# Patient Record
Sex: MALE | Race: WHITE | Employment: UNEMPLOYED | URBAN - METROPOLITAN AREA
[De-identification: names, ages, dates, MRNs, and addresses within clinical notes are randomized per-mention and may not be internally consistent; named-entity substitution may affect disease eponyms.]

---

## 2017-02-01 ENCOUNTER — OFFICE VISIT (OUTPATIENT)
Dept: INTERNAL MEDICINE | Age: 22
End: 2017-02-01

## 2017-02-01 VITALS
HEART RATE: 92 BPM | SYSTOLIC BLOOD PRESSURE: 114 MMHG | WEIGHT: 141 LBS | DIASTOLIC BLOOD PRESSURE: 72 MMHG | BODY MASS INDEX: 20.23 KG/M2 | OXYGEN SATURATION: 99 %

## 2017-02-01 DIAGNOSIS — K64.5 THROMBOSED HEMORRHOIDS: Primary | ICD-10-CM

## 2017-02-01 DIAGNOSIS — J00 ACUTE NASOPHARYNGITIS: ICD-10-CM

## 2017-02-01 DIAGNOSIS — R52 BODY ACHES: ICD-10-CM

## 2017-02-01 LAB
INFLUENZA A ANTIBODY: NORMAL
INFLUENZA B ANTIBODY: NORMAL

## 2017-02-01 PROCEDURE — 87804 INFLUENZA ASSAY W/OPTIC: CPT | Performed by: INTERNAL MEDICINE

## 2017-02-01 PROCEDURE — 99213 OFFICE O/P EST LOW 20 MIN: CPT | Performed by: INTERNAL MEDICINE

## 2017-02-01 RX ORDER — AZITHROMYCIN 250 MG/1
TABLET, FILM COATED ORAL
Qty: 1 PACKET | Refills: 0 | Status: SHIPPED | OUTPATIENT
Start: 2017-02-01 | End: 2017-02-11

## 2017-02-01 ASSESSMENT — ENCOUNTER SYMPTOMS
CHEST TIGHTNESS: 0
RESPIRATORY NEGATIVE: 1
WHEEZING: 0
SHORTNESS OF BREATH: 0
GASTROINTESTINAL NEGATIVE: 1

## 2017-02-06 ENCOUNTER — OFFICE VISIT (OUTPATIENT)
Dept: SURGERY | Age: 22
End: 2017-02-06

## 2017-02-06 VITALS
WEIGHT: 139 LBS | BODY MASS INDEX: 19.9 KG/M2 | DIASTOLIC BLOOD PRESSURE: 72 MMHG | HEIGHT: 70 IN | SYSTOLIC BLOOD PRESSURE: 122 MMHG

## 2017-02-06 DIAGNOSIS — K62.89 ANAL PAIN: Primary | ICD-10-CM

## 2017-02-06 DIAGNOSIS — K64.5 THROMBOSED HEMORRHOIDS: ICD-10-CM

## 2017-02-06 PROCEDURE — 46320 REMOVAL OF HEMORRHOID CLOT: CPT | Performed by: SURGERY

## 2017-02-14 ENCOUNTER — TELEPHONE (OUTPATIENT)
Dept: SURGERY | Age: 22
End: 2017-02-14

## 2017-02-23 ENCOUNTER — TELEPHONE (OUTPATIENT)
Dept: SURGERY | Age: 22
End: 2017-02-23

## 2017-03-08 ENCOUNTER — OFFICE VISIT (OUTPATIENT)
Dept: SURGERY | Age: 22
End: 2017-03-08

## 2017-03-08 VITALS
SYSTOLIC BLOOD PRESSURE: 120 MMHG | WEIGHT: 145 LBS | BODY MASS INDEX: 20.76 KG/M2 | HEIGHT: 70 IN | DIASTOLIC BLOOD PRESSURE: 76 MMHG

## 2017-03-08 DIAGNOSIS — K64.5 THROMBOSED HEMORRHOIDS: Primary | ICD-10-CM

## 2017-03-08 DIAGNOSIS — K62.89 ANAL PAIN: ICD-10-CM

## 2017-03-08 PROCEDURE — 99213 OFFICE O/P EST LOW 20 MIN: CPT | Performed by: SURGERY

## 2017-09-26 ENCOUNTER — TELEPHONE (OUTPATIENT)
Dept: INTERNAL MEDICINE | Age: 22
End: 2017-09-26

## 2017-09-27 ENCOUNTER — TELEPHONE (OUTPATIENT)
Dept: INTERNAL MEDICINE | Age: 22
End: 2017-09-27

## 2017-09-29 ENCOUNTER — OFFICE VISIT (OUTPATIENT)
Dept: INTERNAL MEDICINE | Age: 22
End: 2017-09-29

## 2017-09-29 VITALS
SYSTOLIC BLOOD PRESSURE: 110 MMHG | BODY MASS INDEX: 21.81 KG/M2 | WEIGHT: 152 LBS | HEART RATE: 74 BPM | DIASTOLIC BLOOD PRESSURE: 72 MMHG | OXYGEN SATURATION: 99 %

## 2017-09-29 DIAGNOSIS — L50.3 DERMATOGRAPHISM: ICD-10-CM

## 2017-09-29 DIAGNOSIS — L50.8 URTICARIA, CHRONIC: ICD-10-CM

## 2017-09-29 DIAGNOSIS — L50.8 URTICARIA, CHRONIC: Primary | ICD-10-CM

## 2017-09-29 PROBLEM — J00 ACUTE NASOPHARYNGITIS: Status: RESOLVED | Noted: 2017-02-01 | Resolved: 2017-09-29

## 2017-09-29 LAB
A/G RATIO: 2.2 (ref 1.1–2.2)
ALBUMIN SERPL-MCNC: 4.9 G/DL (ref 3.4–5)
ALP BLD-CCNC: 64 U/L (ref 40–129)
ALT SERPL-CCNC: 18 U/L (ref 10–40)
ANION GAP SERPL CALCULATED.3IONS-SCNC: 13 MMOL/L (ref 3–16)
AST SERPL-CCNC: 17 U/L (ref 15–37)
BASOPHILS ABSOLUTE: 0 K/UL (ref 0–0.2)
BASOPHILS RELATIVE PERCENT: 0.2 %
BILIRUB SERPL-MCNC: 0.9 MG/DL (ref 0–1)
BUN BLDV-MCNC: 10 MG/DL (ref 7–20)
CALCIUM SERPL-MCNC: 9.9 MG/DL (ref 8.3–10.6)
CHLORIDE BLD-SCNC: 101 MMOL/L (ref 99–110)
CO2: 28 MMOL/L (ref 21–32)
CREAT SERPL-MCNC: 0.9 MG/DL (ref 0.9–1.3)
EOSINOPHILS ABSOLUTE: 0.1 K/UL (ref 0–0.6)
EOSINOPHILS RELATIVE PERCENT: 1.3 %
GFR AFRICAN AMERICAN: >60
GFR NON-AFRICAN AMERICAN: >60
GLOBULIN: 2.2 G/DL
GLUCOSE BLD-MCNC: 89 MG/DL (ref 70–99)
HCT VFR BLD CALC: 46.1 % (ref 40.5–52.5)
HEMOGLOBIN: 16 G/DL (ref 13.5–17.5)
IGA: 126 MG/DL (ref 70–400)
IGG: 797 MG/DL (ref 700–1600)
IGM: 103 MG/DL (ref 40–230)
LYMPHOCYTES ABSOLUTE: 1.5 K/UL (ref 1–5.1)
LYMPHOCYTES RELATIVE PERCENT: 35.8 %
MCH RBC QN AUTO: 31.6 PG (ref 26–34)
MCHC RBC AUTO-ENTMCNC: 34.7 G/DL (ref 31–36)
MCV RBC AUTO: 91 FL (ref 80–100)
MONOCYTES ABSOLUTE: 0.2 K/UL (ref 0–1.3)
MONOCYTES RELATIVE PERCENT: 5.5 %
NEUTROPHILS ABSOLUTE: 2.4 K/UL (ref 1.7–7.7)
NEUTROPHILS RELATIVE PERCENT: 57.2 %
PDW BLD-RTO: 13.1 % (ref 12.4–15.4)
PLATELET # BLD: 162 K/UL (ref 135–450)
PMV BLD AUTO: 9.2 FL (ref 5–10.5)
POTASSIUM SERPL-SCNC: 4.3 MMOL/L (ref 3.5–5.1)
RBC # BLD: 5.06 M/UL (ref 4.2–5.9)
SEDIMENTATION RATE, ERYTHROCYTE: 3 MM/HR (ref 0–15)
SODIUM BLD-SCNC: 142 MMOL/L (ref 136–145)
TOTAL PROTEIN: 7.1 G/DL (ref 6.4–8.2)
TSH SERPL DL<=0.05 MIU/L-ACNC: 2.38 UIU/ML (ref 0.27–4.2)
WBC # BLD: 4.3 K/UL (ref 4–11)

## 2017-09-29 PROCEDURE — 99213 OFFICE O/P EST LOW 20 MIN: CPT | Performed by: INTERNAL MEDICINE

## 2017-09-29 RX ORDER — FEXOFENADINE HCL 180 MG/1
180 TABLET ORAL DAILY
Qty: 30 TABLET | Refills: 3 | Status: SHIPPED | OUTPATIENT
Start: 2017-09-29

## 2017-09-29 RX ORDER — FAMOTIDINE 40 MG/1
40 TABLET, FILM COATED ORAL EVERY EVENING
Qty: 30 TABLET | Refills: 3 | Status: SHIPPED | OUTPATIENT
Start: 2017-09-29

## 2017-09-29 ASSESSMENT — ENCOUNTER SYMPTOMS
RESPIRATORY NEGATIVE: 1
WHEEZING: 0
GASTROINTESTINAL NEGATIVE: 1
SHORTNESS OF BREATH: 0
CHEST TIGHTNESS: 0

## 2017-09-29 ASSESSMENT — PATIENT HEALTH QUESTIONNAIRE - PHQ9
SUM OF ALL RESPONSES TO PHQ9 QUESTIONS 1 & 2: 0
1. LITTLE INTEREST OR PLEASURE IN DOING THINGS: 0
2. FEELING DOWN, DEPRESSED OR HOPELESS: 0
SUM OF ALL RESPONSES TO PHQ QUESTIONS 1-9: 0

## 2017-10-02 LAB
ANA INTERPRETATION: NORMAL
ANTI-NUCLEAR ANTIBODY (ANA): NEGATIVE
IGE: 143 KU/L

## 2017-10-04 ENCOUNTER — PATIENT MESSAGE (OUTPATIENT)
Dept: INTERNAL MEDICINE | Age: 22
End: 2017-10-04

## 2017-10-12 ENCOUNTER — OFFICE VISIT (OUTPATIENT)
Dept: INTERNAL MEDICINE | Age: 22
End: 2017-10-12

## 2017-10-12 VITALS
BODY MASS INDEX: 21.81 KG/M2 | SYSTOLIC BLOOD PRESSURE: 112 MMHG | OXYGEN SATURATION: 100 % | HEART RATE: 90 BPM | WEIGHT: 152 LBS | DIASTOLIC BLOOD PRESSURE: 64 MMHG

## 2017-10-12 DIAGNOSIS — L50.8 URTICARIA, CHRONIC: ICD-10-CM

## 2017-10-12 DIAGNOSIS — L50.3 DERMATOGRAPHISM: ICD-10-CM

## 2017-10-12 DIAGNOSIS — F41.9 ANXIETY: ICD-10-CM

## 2017-10-12 PROCEDURE — 99213 OFFICE O/P EST LOW 20 MIN: CPT | Performed by: INTERNAL MEDICINE

## 2017-10-12 RX ORDER — ESCITALOPRAM OXALATE 10 MG/1
10 TABLET ORAL DAILY
Qty: 30 TABLET | Refills: 3 | Status: SHIPPED | OUTPATIENT
Start: 2017-10-12 | End: 2018-03-30

## 2018-03-30 ENCOUNTER — OFFICE VISIT (OUTPATIENT)
Dept: INTERNAL MEDICINE | Age: 23
End: 2018-03-30

## 2018-03-30 VITALS
HEIGHT: 70 IN | HEART RATE: 70 BPM | DIASTOLIC BLOOD PRESSURE: 70 MMHG | SYSTOLIC BLOOD PRESSURE: 120 MMHG | WEIGHT: 158.2 LBS | BODY MASS INDEX: 22.65 KG/M2 | OXYGEN SATURATION: 98 %

## 2018-03-30 DIAGNOSIS — F41.9 ANXIETY: ICD-10-CM

## 2018-03-30 PROCEDURE — 99213 OFFICE O/P EST LOW 20 MIN: CPT | Performed by: INTERNAL MEDICINE

## 2018-03-30 NOTE — PROGRESS NOTES
Subjective:      Patient ID: Kareem Gonzales is a 25 y.o. y.o. male. HPI    Patient is here for a recheck    Vitals:    03/30/18 1217   BP: 120/70   Pulse: 70   SpO2: 98%       Wt Readings from Last 3 Encounters:   03/30/18 158 lb 3.2 oz (71.8 kg)   10/12/17 152 lb (68.9 kg)   09/29/17 152 lb (68.9 kg)     He did not like Lexapro. He states it made him more relaxed but did not take care of anxiety. He states he was told in the past he had ADHD. He has not been officially tested. He has been on Adderall in the past and did well. He states he did well. Discussed use, benefit, and side effects of prescribed medications. Barriers to medication compliance addressed. All patient questions answered. Pt voiced understanding. Review of Systems   Psychiatric/Behavioral: Positive for decreased concentration. Negative for agitation, sleep disturbance and suicidal ideas. The patient is nervous/anxious. Objective:   Physical Exam   Constitutional: He appears well-developed and well-nourished. Neck: Trachea normal. No JVD present. Carotid bruit is not present. No edema present. Cardiovascular: Normal rate, regular rhythm, S1 normal and S2 normal.    No murmur heard. Pulmonary/Chest: Effort normal and breath sounds normal. He has no wheezes. He has no rhonchi. He has no rales.        Assessment:      See Problem List assessment and plan       Plan:     Anxiety  Will send for ADHD testing  Off medication

## 2018-11-30 ENCOUNTER — TELEPHONE (OUTPATIENT)
Dept: INTERNAL MEDICINE CLINIC | Age: 23
End: 2018-11-30

## 2018-11-30 ENCOUNTER — OFFICE VISIT (OUTPATIENT)
Dept: INTERNAL MEDICINE CLINIC | Age: 23
End: 2018-11-30
Payer: COMMERCIAL

## 2018-11-30 VITALS
OXYGEN SATURATION: 98 % | SYSTOLIC BLOOD PRESSURE: 100 MMHG | WEIGHT: 161 LBS | HEIGHT: 70 IN | DIASTOLIC BLOOD PRESSURE: 60 MMHG | BODY MASS INDEX: 23.05 KG/M2 | HEART RATE: 69 BPM

## 2018-11-30 DIAGNOSIS — S83.511D RUPTURE OF ANTERIOR CRUCIATE LIGAMENT OF RIGHT KNEE, SUBSEQUENT ENCOUNTER: ICD-10-CM

## 2018-11-30 DIAGNOSIS — Z01.818 PREOP EXAMINATION: Primary | ICD-10-CM

## 2018-11-30 PROCEDURE — 99243 OFF/OP CNSLTJ NEW/EST LOW 30: CPT | Performed by: NURSE PRACTITIONER

## 2018-11-30 RX ORDER — DEXTROAMPHETAMINE SACCHARATE, AMPHETAMINE ASPARTATE, DEXTROAMPHETAMINE SULFATE AND AMPHETAMINE SULFATE 2.5; 2.5; 2.5; 2.5 MG/1; MG/1; MG/1; MG/1
10 TABLET ORAL DAILY
COMMUNITY

## 2018-11-30 ASSESSMENT — ENCOUNTER SYMPTOMS
EYE REDNESS: 0
SORE THROAT: 0
NAUSEA: 0
CHEST TIGHTNESS: 0
RHINORRHEA: 0
VOMITING: 0
SINUS PRESSURE: 0
CONSTIPATION: 0
COLOR CHANGE: 0
BACK PAIN: 0
SHORTNESS OF BREATH: 0
ABDOMINAL PAIN: 0
EYE ITCHING: 0
BLOOD IN STOOL: 0
DIARRHEA: 0
COUGH: 0
WHEEZING: 0

## 2018-11-30 ASSESSMENT — PATIENT HEALTH QUESTIONNAIRE - PHQ9
SUM OF ALL RESPONSES TO PHQ QUESTIONS 1-9: 0
2. FEELING DOWN, DEPRESSED OR HOPELESS: 0
SUM OF ALL RESPONSES TO PHQ9 QUESTIONS 1 & 2: 0
SUM OF ALL RESPONSES TO PHQ QUESTIONS 1-9: 0
1. LITTLE INTEREST OR PLEASURE IN DOING THINGS: 0

## 2019-05-20 ENCOUNTER — TELEPHONE (OUTPATIENT)
Dept: SURGERY | Age: 24
End: 2019-05-20

## 2019-05-20 NOTE — TELEPHONE ENCOUNTER
Pt's mom, Danya Houston called to make him an appt due to bleeding hemorrhoids  Pt is in a lot of pain  He did not go to work today  He was seen by Dr Surjit Kevin in 2017 for hemorrhoids    No available appts until No[place]

## 2019-05-21 ENCOUNTER — OFFICE VISIT (OUTPATIENT)
Dept: SURGERY | Age: 24
End: 2019-05-21
Payer: COMMERCIAL

## 2019-05-21 VITALS
HEART RATE: 62 BPM | BODY MASS INDEX: 21.62 KG/M2 | SYSTOLIC BLOOD PRESSURE: 119 MMHG | DIASTOLIC BLOOD PRESSURE: 65 MMHG | WEIGHT: 151 LBS | HEIGHT: 70 IN

## 2019-05-21 DIAGNOSIS — K64.5 THROMBOSED HEMORRHOIDS: Primary | ICD-10-CM

## 2019-05-21 PROCEDURE — 46320 REMOVAL OF HEMORRHOID CLOT: CPT | Performed by: SURGERY

## 2019-05-21 NOTE — PROGRESS NOTES
EXCISION OF EXTERNAL HEMORRHOID PROCEDURE NOTE:     Patient presented with symptomatic thrombosed external hemorrhoid unresponsive to conservative management.     Risks of procedure explained to patient, including but not limited to: immediate and delayed bleeding, pain, infection, pelvic sepsis, urinary retention, sphincter dysfunction, need for additional procedures, and recurrence. Chaperone/MA present in room during entire procedure.     Patient was placed in knee-chest positioning. Exam table manipulated for proper visualization and lighting. Buttocks spread. Digital exam and anoscopy performed revealing left posterolateral thrombosed external hemorrhoid x 2.     Lidocaine jelly used for topical anesthetic. Betadine used for skin prep. 5 cc local anesthetic (1% lidocaine) used for local anesthetic. 11 blade scalpel used to make elliptical incision. Scissors used to dissect under external hemorrhoid plexus x 2. Completely excised both hemorrhoids. Pressure held for hemostasis.      Patient tolerated the procedure well without complication. Minimal blood loss. Post procedure expectations and instructions explained to patient.     Disposition: Follow up in office in 2-3 weeks for wound check. DISCHARGE INSTRUCTIONS:  Keep stools soft and regular with the use of a fiber supplement, drinking plenty of water, and a diet consisting of fruits and vegetables. MiraLax and Colace (stool softener) can be used as needed as well. Cleanse the wound twice daily and after bowel movements with a sitz bath or gentle water spray from a shower head. Please call the office ([phone]) or go to the nearest emergency room if you have any questions or concerns, or experience: uncontrolled bleeding, fever, chills, change in appetite, foul smelling drainage, or difficulty passing urine. Be sure to follow up in the office to check your wound as scheduled.

## 2019-05-21 NOTE — PATIENT INSTRUCTIONS
DISCHARGE INSTRUCTIONS:  Keep stools soft and regular with the use of a fiber supplement, drinking plenty of water, and a diet consisting of fruits and vegetables. MiraLax and Colace (stool softener) can be used as needed as well.  Cleanse the wound twice daily and after bowel movements with a sitz bath or gentle water spray from a shower head.     Please call the office ([phone]) or go to the nearest emergency room if you have any questions or concerns, or experience: uncontrolled bleeding, fever, chills, change in appetite, foul smelling drainage, or difficulty passing urine.     Be sure to follow up in the office to check your wound as scheduled

## 2019-06-11 ENCOUNTER — OFFICE VISIT (OUTPATIENT)
Dept: SURGERY | Age: 24
End: 2019-06-11

## 2019-06-11 VITALS
HEIGHT: 70 IN | WEIGHT: 147 LBS | HEART RATE: 66 BPM | SYSTOLIC BLOOD PRESSURE: 129 MMHG | DIASTOLIC BLOOD PRESSURE: 74 MMHG | BODY MASS INDEX: 21.05 KG/M2

## 2019-06-11 DIAGNOSIS — K64.5 THROMBOSED HEMORRHOIDS: Primary | ICD-10-CM

## 2019-06-11 PROCEDURE — 99024 POSTOP FOLLOW-UP VISIT: CPT | Performed by: SURGERY

## 2019-06-11 NOTE — PROGRESS NOTES
[address] [address] [address] [employer]  [address] [facility]  Dept: [phone]  Dept Fax: [phone]  Loc: [phone]    Visit Date: 6/11/2019    Danet Montiel is a 25 y.o. male who presents today for: Post-Op Check (2 week post op: Thrombosed hemorrhoids)      HPI:       Dante Montiel is a 25 y.o. male known to me after excision of thrombosed external hemorrhoid in the office 2 weeks ago. Overall doing much better. Minimal pain. No bleeding. Tolerating regular diet and having regular bowel movements. History reviewed. No pertinent past medical history. History reviewed. No pertinent surgical history. Current Outpatient Medications:     amphetamine-dextroamphetamine (ADDERALL) 10 MG tablet, Take 10 mg by mouth daily. ., Disp: , Rfl:     fexofenadine (ALLEGRA ALLERGY) 180 MG tablet, Take 1 tablet by mouth daily, Disp: 30 tablet, Rfl: 3    famotidine (PEPCID) 40 MG tablet, Take 1 tablet by mouth every evening, Disp: 30 tablet, Rfl: 3  Allergies   Allergen Reactions    No Known Allergies      History reviewed. No pertinent surgical history. Family History   Problem Relation Age of Onset    Hypertension Father        Social History:   Social History     Tobacco Use    Smoking status: Never Smoker    Smokeless tobacco: Never Used   Substance Use Topics    Alcohol use: No     Comment: occasionally      Tobacco cessation counseling provided as appropriate. REVIEW OF SYSTEMS:    Pertinent positives and negatives are mentioned in the HPI. Otherwise, all other systems were reviewed and negative. Objective:     Physical Exam   /74 (Site: Left Upper Arm, Position: Sitting, Cuff Size: Small Adult)   Pulse 66   Ht 5' 10\" (1.778 m)   Wt 147 lb (66.7 kg)   BMI 21.09 kg/m²   Constitutional: Appears well-developed and well-nourished. Grooming appropriate. No gross deformities. Body mass index is 21.09 kg/m². Eyes: No scleral icterus. Conjunctiva/lids normal. Vision intact grossly. Pupils equal/symmetric, reactive bilaterally. ENT: External ears/nose without defect, scars, or masses. Hearing grossly intact. No facial deformity. Lips normal, normal dentition. Neck: No masses. Trachea midline. No crepitus. Thyroid not enlarged. Cardiovascular: Normal rate. No peripheral edema. Abdominal aorta normal size to palpation. Pulmonary/Chest: Effort normal. No respiratory distress. No wheezes. No use of accessory muscles. Musculoskeletal: Normal range of motion x all 4 extremities and head/neck, without deformity, pain, or crepitus, with normal strength and tone. Normal gait. Nails without clubbing or cyanosis. Neurological: Alert and oriented to person, place, and time. No gross deficits. Sensation intact. Skin: Skin is dry. No rashes noted. No pallor. No induration of nodules. Psychiatric: Normal mood and affect. Behavior normal. Oriented to person, place, and time. Judgment and insight reasonable. Abdominal/wound: Healing very well    Pertinent recent lab and radiology results reviewed       Assessment/Plan:       A/P:  Established problem(s): Thrombosed external hemorrhoid status post excision in office  Additional workup/treatment planned: medical mnmgt  Risk of complications/morbidity: moderate    Overall doing well after excision of thrombosed external hemorrhoid in the office 2 weeks ago. Wound has healed exceptionally well. He is not having any more pain or discomfort. Discussed techniques to help avoid recurrence, such as continuing to keep stool soft and avoid straining and constipation. DISPOSITION:  F/u PRN    My findings will be relayed to consulting practitioner or PCP via Epic note    Note completed using dictation software, please excuse any errors.     Electronically signed by Henrique Ross MD on 6/11/2019 at 2:55 PM

## 2020-07-31 ENCOUNTER — OFFICE VISIT (OUTPATIENT)
Dept: INTERNAL MEDICINE CLINIC | Age: 25
End: 2020-07-31
Payer: COMMERCIAL

## 2020-07-31 VITALS
HEART RATE: 64 BPM | DIASTOLIC BLOOD PRESSURE: 72 MMHG | WEIGHT: 162.3 LBS | TEMPERATURE: 97.7 F | OXYGEN SATURATION: 98 % | SYSTOLIC BLOOD PRESSURE: 128 MMHG | BODY MASS INDEX: 23.29 KG/M2

## 2020-07-31 PROBLEM — R51.9 NEW ONSET HEADACHE: Status: ACTIVE | Noted: 2020-07-31

## 2020-07-31 PROCEDURE — 99214 OFFICE O/P EST MOD 30 MIN: CPT | Performed by: NURSE PRACTITIONER

## 2020-07-31 ASSESSMENT — PATIENT HEALTH QUESTIONNAIRE - PHQ9
DEPRESSION UNABLE TO ASSESS: FUNCTIONAL CAPACITY MOTIVATION LIMITS ACCURACY
SUM OF ALL RESPONSES TO PHQ9 QUESTIONS 1 & 2: 0
SUM OF ALL RESPONSES TO PHQ QUESTIONS 1-9: 0
2. FEELING DOWN, DEPRESSED OR HOPELESS: 0
1. LITTLE INTEREST OR PLEASURE IN DOING THINGS: 0
SUM OF ALL RESPONSES TO PHQ QUESTIONS 1-9: 0

## 2020-07-31 ASSESSMENT — ENCOUNTER SYMPTOMS
SINUS PAIN: 0
VOMITING: 0
DIARRHEA: 0
BACK PAIN: 0
ABDOMINAL PAIN: 0
SHORTNESS OF BREATH: 0
COLOR CHANGE: 0
CONSTIPATION: 0
NAUSEA: 0
WHEEZING: 0
SINUS PRESSURE: 0
COUGH: 0

## 2020-07-31 NOTE — ASSESSMENT & PLAN NOTE
Obtain CT given persistent headache x 1 week   Start tylenol for pain   Increase water   Call if symptoms worsen or fail to improve

## 2020-07-31 NOTE — PROGRESS NOTES
Subjective:      Patient ID: Anais Sexton is a 22 y.o. male. Chief Complaint   Patient presents with    Head Injury       HPI  Galen Gilbert is here for concern of headache that started after lifting weight last Wednesday. He was doing a leg press and straining when he felt a \"pop\" in the right side of his head near his temple. He felt slightly dizzy and felt a pain in his head that lasted about an hour. He has been having headaches in that area since. Headaches are worse when he is working out or straining. Rates about 1-2/10 when not straining but does not go away completely. He denies vision changes, nausea, or vomiting. He has not taken anything for pain. Review of Systems   Constitutional: Negative for chills, fatigue and fever. HENT: Negative for congestion, sinus pressure and sinus pain. Eyes: Negative for visual disturbance. Respiratory: Negative for cough, shortness of breath and wheezing. Cardiovascular: Negative for chest pain and palpitations. Gastrointestinal: Negative for abdominal pain, constipation, diarrhea, nausea and vomiting. Musculoskeletal: Negative for arthralgias, back pain and myalgias. Skin: Negative for color change, pallor and rash. Neurological: Positive for light-headedness and headaches. Negative for dizziness, syncope and weakness. Psychiatric/Behavioral: Negative for behavioral problems, confusion and sleep disturbance. The patient is not nervous/anxious. Objective:   Physical Exam  Constitutional:       Appearance: He is well-developed. HENT:      Head: Normocephalic and atraumatic. Comments: Nontender  Eyes:      Extraocular Movements:      Right eye: Normal extraocular motion and no nystagmus. Left eye: Normal extraocular motion and no nystagmus. Conjunctiva/sclera: Conjunctivae normal.      Pupils: Pupils are equal, round, and reactive to light. Neck:      Musculoskeletal: Normal range of motion and neck supple.       Thyroid: No thyromegaly. Vascular: No JVD. Cardiovascular:      Rate and Rhythm: Normal rate and regular rhythm. Heart sounds: Normal heart sounds. Pulmonary:      Effort: Pulmonary effort is normal. No respiratory distress. Breath sounds: Normal breath sounds. No wheezing. Musculoskeletal: Normal range of motion. General: No deformity. Skin:     General: Skin is warm and dry. Capillary Refill: Capillary refill takes less than 2 seconds. Neurological:      Mental Status: He is alert and oriented to person, place, and time. Cranial Nerves: Cranial nerves are intact. Psychiatric:         Mood and Affect: Mood normal.         Behavior: Behavior normal.         Assessment:      See Problem List assessment and plan       Plan:       New onset headache  Obtain CT given persistent headache x 1 week   Start tylenol for pain   Increase water   Call if symptoms worsen or fail to improve         Patient engaged in shared decision making. Information given to evaluate options of treatment, understand what is needed and discuss importance of following plan.

## 2020-08-05 ENCOUNTER — HOSPITAL ENCOUNTER (OUTPATIENT)
Dept: CT IMAGING | Age: 25
Discharge: HOME | End: 2020-08-05
Payer: COMMERCIAL

## 2020-08-05 PROCEDURE — 70450 CT HEAD/BRAIN W/O DYE: CPT

## 2020-10-28 ENCOUNTER — OFFICE VISIT (OUTPATIENT)
Dept: INTERNAL MEDICINE CLINIC | Age: 25
End: 2020-10-28
Payer: COMMERCIAL

## 2020-10-28 VITALS
DIASTOLIC BLOOD PRESSURE: 74 MMHG | SYSTOLIC BLOOD PRESSURE: 122 MMHG | WEIGHT: 165.3 LBS | BODY MASS INDEX: 23.66 KG/M2 | OXYGEN SATURATION: 98 % | HEIGHT: 70 IN | HEART RATE: 100 BPM

## 2020-10-28 DIAGNOSIS — Z11.3 SCREENING EXAMINATION FOR STD (SEXUALLY TRANSMITTED DISEASE): ICD-10-CM

## 2020-10-28 LAB
BILIRUBIN, POC: NORMAL
BLOOD URINE, POC: NORMAL
CLARITY, POC: CLEAR
COLOR, POC: YELLOW
GLUCOSE URINE, POC: NORMAL
HCT VFR BLD CALC: 49.3 % (ref 40.5–52.5)
HEMOGLOBIN: 16.4 G/DL (ref 13.5–17.5)
HEPATITIS C ANTIBODY INTERPRETATION: NORMAL
KETONES, POC: NORMAL
LEUKOCYTE EST, POC: NORMAL
MCH RBC QN AUTO: 31.5 PG (ref 26–34)
MCHC RBC AUTO-ENTMCNC: 33.4 G/DL (ref 31–36)
MCV RBC AUTO: 94.2 FL (ref 80–100)
NITRITE, POC: NORMAL
PDW BLD-RTO: 13 % (ref 12.4–15.4)
PH, POC: 8.5
PLATELET # BLD: 208 K/UL (ref 135–450)
PMV BLD AUTO: 9.3 FL (ref 5–10.5)
PROTEIN, POC: NORMAL
RBC # BLD: 5.23 M/UL (ref 4.2–5.9)
SPECIFIC GRAVITY, POC: 1.02
UROBILINOGEN, POC: NORMAL
WBC # BLD: 5 K/UL (ref 4–11)

## 2020-10-28 PROCEDURE — 99213 OFFICE O/P EST LOW 20 MIN: CPT | Performed by: NURSE PRACTITIONER

## 2020-10-28 PROCEDURE — 81002 URINALYSIS NONAUTO W/O SCOPE: CPT | Performed by: NURSE PRACTITIONER

## 2020-10-28 ASSESSMENT — ENCOUNTER SYMPTOMS
COUGH: 0
CHEST TIGHTNESS: 0
BLOOD IN STOOL: 0
CONSTIPATION: 0
DIARRHEA: 0
ABDOMINAL PAIN: 0
EYE REDNESS: 0
WHEEZING: 0
NAUSEA: 0
COLOR CHANGE: 0
SHORTNESS OF BREATH: 0
VOMITING: 0
SORE THROAT: 0
SINUS PRESSURE: 0
EYE ITCHING: 0
BACK PAIN: 0
RHINORRHEA: 0

## 2020-10-28 NOTE — PROGRESS NOTES
Subjective:      Patient ID: Crystal Pepper is a 22 y.o. male. No chief complaint on file. HPI  Elizabeth Peoples is in the office today with concerns about HSV-1  He has been sleeping with the same girl for the past month and is not consistently using condoms. He has no sores to his genitals   He states that his girlfriend has no genital sores. He states that she only has oral sores  He has history of cold sores as well. He has no dysuria or discharge  He does have some mild abdominal discomfort. No past medical history on file. No past surgical history on file.   Family History   Problem Relation Age of Onset    Hypertension Father      Social History     Socioeconomic History    Marital status: Single     Spouse name: Not on file    Number of children: Not on file    Years of education: Not on file    Highest education level: Not on file   Occupational History    Occupation: student   Social Needs    Financial resource strain: Not on file    Food insecurity     Worry: Not on file     Inability: Not on file   [employer] needs     Medical: Not on file     Non-medical: Not on file   Tobacco Use    Smoking status: Never Smoker    Smokeless tobacco: Never Used   Substance and Sexual Activity    Alcohol use: No     Comment: occasionally    Drug use: No    Sexual activity: Not on file   Lifestyle    Physical activity     Days per week: Not on file     Minutes per session: Not on file    Stress: Not on file   Relationships    Social connections     Talks on phone: Not on file     Gets together: Not on file     Attends Scientologist service: Not on file     Active member of club or organization: Not on file     Attends meetings of clubs or organizations: Not on file     Relationship status: Not on file    Intimate partner violence     Fear of current or ex partner: Not on file     Emotionally abused: Not on file     Physically abused: Not on file     Forced sexual activity: Not on file   Other Topics Concern    Not on file   Social History Narrative    Not on file       Review of Systems   Constitutional: Negative for chills, fatigue and fever. HENT: Negative for congestion, ear pain, postnasal drip, rhinorrhea, sinus pressure, sneezing and sore throat. Cold sores     Eyes: Negative for redness and itching. Respiratory: Negative for cough, chest tightness, shortness of breath and wheezing. Cardiovascular: Negative for chest pain and palpitations. Gastrointestinal: Negative for abdominal pain, blood in stool, constipation, diarrhea, nausea and vomiting. Endocrine: Negative for cold intolerance and heat intolerance. Genitourinary: Negative for difficulty urinating, dysuria, flank pain, frequency, hematuria and urgency. Musculoskeletal: Negative for arthralgias, back pain, joint swelling and myalgias. Skin: Negative for color change, pallor, rash and wound. Allergic/Immunologic: Negative for environmental allergies and food allergies. Neurological: Negative for dizziness, seizures, syncope, weakness, light-headedness, numbness and headaches. Hematological: Negative for adenopathy. Does not bruise/bleed easily. Psychiatric/Behavioral: Negative for confusion, sleep disturbance and suicidal ideas. The patient is not nervous/anxious and is not hyperactive. Objective:     Vitals:    10/28/20 1415   BP: 122/74   Site: Left Upper Arm   Position: Sitting   Cuff Size: Medium Adult   Pulse: 100   SpO2: 98%   Weight: 165 lb 4.8 oz (75 kg)   Height: 5' 10\" (1.778 m)     Physical Exam  Constitutional:       Appearance: He is well-developed. HENT:      Right Ear: Hearing, tympanic membrane, ear canal and external ear normal.      Left Ear: Hearing, tympanic membrane, ear canal and external ear normal.      Nose: No mucosal edema or rhinorrhea. Right Sinus: No maxillary sinus tenderness or frontal sinus tenderness.       Left Sinus: No maxillary sinus tenderness or frontal sinus tenderness. Mouth/Throat:      Pharynx: No oropharyngeal exudate or posterior oropharyngeal erythema. Tonsils: No tonsillar abscesses. Cardiovascular:      Rate and Rhythm: Normal rate and regular rhythm. Heart sounds: Normal heart sounds. Pulmonary:      Effort: Pulmonary effort is normal.      Breath sounds: Normal breath sounds. Lymphadenopathy:      Head:      Right side of head: No submental, submandibular, tonsillar, preauricular, posterior auricular or occipital adenopathy. Left side of head: No submental, submandibular, tonsillar, preauricular, posterior auricular or occipital adenopathy. Cervical: No cervical adenopathy. Skin:     General: Skin is warm and dry. Current Outpatient Medications   Medication Sig Dispense Refill    amphetamine-dextroamphetamine (ADDERALL) 10 MG tablet Take 10 mg by mouth daily. Patsy Bame fexofenadine (ALLEGRA ALLERGY) 180 MG tablet Take 1 tablet by mouth daily 30 tablet 3    famotidine (PEPCID) 40 MG tablet Take 1 tablet by mouth every evening 30 tablet 3     No current facility-administered medications for this visit. PHQ Scores 7/31/2020 11/30/2018 9/29/2017   PHQ2 Score 0 0 0   PHQ9 Score 0 0 0           :     1. Abnormal urine    2. Screening examination for STD (sexually transmitted disease)      Plan:     Problem List     Screening examination for STD (sexually transmitted disease)     Educated on condom use  Safe sex practices  Education on STDs         Relevant Orders    CBC (Completed)    C.trachomatis N.gonorrhoeae DNA, Urine    RPR Reflex to Titer and TPPA    Hepatitis C Antibody (Completed)    HIV Screen    Herpes Simplex Virus,I/II,IgG             Discussed use, benefit, and side effects of all prescribed medications. Barriers to medication compliance addressed. All patient questions answered. Pt voiced understanding. All patientquestions answered. Pt voiced understanding.

## 2020-10-29 PROBLEM — Z11.3 SCREENING EXAMINATION FOR STD (SEXUALLY TRANSMITTED DISEASE): Status: ACTIVE | Noted: 2020-10-29

## 2020-10-29 LAB
HERPES SIMPLEX VIRUS 1 IGG: NEGATIVE
HERPES SIMPLEX VIRUS 2 IGG: NEGATIVE
HIV AG/AB: NORMAL
HIV ANTIGEN: NORMAL
HIV-1 ANTIBODY: NORMAL
HIV-2 AB: NORMAL
TOTAL SYPHILLIS IGG/IGM: NORMAL

## 2020-10-29 ASSESSMENT — PATIENT HEALTH QUESTIONNAIRE - PHQ9
SUM OF ALL RESPONSES TO PHQ9 QUESTIONS 1 & 2: 0
1. LITTLE INTEREST OR PLEASURE IN DOING THINGS: 0
SUM OF ALL RESPONSES TO PHQ QUESTIONS 1-9: 0
SUM OF ALL RESPONSES TO PHQ QUESTIONS 1-9: 0
2. FEELING DOWN, DEPRESSED OR HOPELESS: 0
SUM OF ALL RESPONSES TO PHQ QUESTIONS 1-9: 0

## 2020-10-30 LAB
C. TRACHOMATIS DNA ,URINE: NEGATIVE
N. GONORRHOEAE DNA, URINE: NEGATIVE

## 2020-11-28 PROBLEM — Z11.3 SCREENING EXAMINATION FOR STD (SEXUALLY TRANSMITTED DISEASE): Status: RESOLVED | Noted: 2020-10-29 | Resolved: 2020-11-28

## 2022-09-28 ENCOUNTER — OFFICE VISIT (OUTPATIENT)
Dept: FAMILY MEDICINE CLINIC | Age: 27
End: 2022-09-28
Payer: COMMERCIAL

## 2022-09-28 ENCOUNTER — TELEPHONE (OUTPATIENT)
Dept: FAMILY MEDICINE CLINIC | Age: 27
End: 2022-09-28

## 2022-09-28 VITALS
HEART RATE: 102 BPM | BODY MASS INDEX: 21.42 KG/M2 | SYSTOLIC BLOOD PRESSURE: 128 MMHG | WEIGHT: 153 LBS | HEIGHT: 71 IN | TEMPERATURE: 97.7 F | DIASTOLIC BLOOD PRESSURE: 84 MMHG | OXYGEN SATURATION: 99 %

## 2022-09-28 DIAGNOSIS — T49.93XA: ICD-10-CM

## 2022-09-28 DIAGNOSIS — T49.93XA: Primary | ICD-10-CM

## 2022-09-28 PROBLEM — T49.91XA: Status: ACTIVE | Noted: 2022-09-28

## 2022-09-28 LAB
APTT: 32.7 SEC (ref 23–34.3)
INR BLD: 1.06 (ref 0.87–1.14)
PROTHROMBIN TIME: 13.7 SEC (ref 11.7–14.5)

## 2022-09-28 PROCEDURE — 99213 OFFICE O/P EST LOW 20 MIN: CPT | Performed by: NURSE PRACTITIONER

## 2022-09-28 SDOH — ECONOMIC STABILITY: FOOD INSECURITY: WITHIN THE PAST 12 MONTHS, YOU WORRIED THAT YOUR FOOD WOULD RUN OUT BEFORE YOU GOT MONEY TO BUY MORE.: NEVER TRUE

## 2022-09-28 SDOH — ECONOMIC STABILITY: FOOD INSECURITY: WITHIN THE PAST 12 MONTHS, THE FOOD YOU BOUGHT JUST DIDN'T LAST AND YOU DIDN'T HAVE MONEY TO GET MORE.: NEVER TRUE

## 2022-09-28 ASSESSMENT — ENCOUNTER SYMPTOMS
RHINORRHEA: 0
NAUSEA: 0
SORE THROAT: 0
ABDOMINAL PAIN: 0
CHEST TIGHTNESS: 0
SHORTNESS OF BREATH: 0
WHEEZING: 0
COUGH: 0
SINUS PRESSURE: 0
BACK PAIN: 0
COLOR CHANGE: 0
EYE ITCHING: 0
CONSTIPATION: 0
VOMITING: 0
BLOOD IN STOOL: 0
EYE REDNESS: 0
DIARRHEA: 0

## 2022-09-28 ASSESSMENT — PATIENT HEALTH QUESTIONNAIRE - PHQ9
SUM OF ALL RESPONSES TO PHQ QUESTIONS 1-9: 0
2. FEELING DOWN, DEPRESSED OR HOPELESS: 0
DEPRESSION UNABLE TO ASSESS: FUNCTIONAL CAPACITY MOTIVATION LIMITS ACCURACY
SUM OF ALL RESPONSES TO PHQ QUESTIONS 1-9: 0
SUM OF ALL RESPONSES TO PHQ9 QUESTIONS 1 & 2: 0
1. LITTLE INTEREST OR PLEASURE IN DOING THINGS: 0

## 2022-09-28 ASSESSMENT — SOCIAL DETERMINANTS OF HEALTH (SDOH): HOW HARD IS IT FOR YOU TO PAY FOR THE VERY BASICS LIKE FOOD, HOUSING, MEDICAL CARE, AND HEATING?: NOT HARD AT ALL

## 2022-09-28 NOTE — TELEPHONE ENCOUNTER
Pt would liked to tested for thallium poisoning. Pt believes he has been being poisoned with the heavy metal and would explain his symptoms of stomach issues, and pain in legs, lethargy. He has been experiencing these symptoms after certain people he has been around have made food for him. Pt's lateral part of eyebrow has disappeared which is a result of thallium poisoning, in the past couple months he almost had an epileptic episode. He has been experiencing headaches, muscle weakness, hair falling out.

## 2022-09-28 NOTE — TELEPHONE ENCOUNTER
----- Message from Cesar Sb sent at 9/28/2022 11:19 AM EDT -----  Subject: Message to Provider    QUESTIONS  Information for Provider? Patient requesting a phone call from PCP. He   would not elaborate but has questions around treatment.   ---------------------------------------------------------------------------  --------------  [address] [employer]  [phone]; OK to leave message on voicemail  ---------------------------------------------------------------------------  --------------  SCRIPT ANSWERS  Relationship to Patient?  Self

## 2022-09-29 LAB
A/G RATIO: 2.7 (ref 1.1–2.2)
ALBUMIN SERPL-MCNC: 5.4 G/DL (ref 3.4–5)
ALP BLD-CCNC: 70 U/L (ref 40–129)
ALT SERPL-CCNC: 14 U/L (ref 10–40)
ANION GAP SERPL CALCULATED.3IONS-SCNC: 13 MMOL/L (ref 3–16)
AST SERPL-CCNC: 14 U/L (ref 15–37)
BILIRUB SERPL-MCNC: 0.6 MG/DL (ref 0–1)
BUN BLDV-MCNC: 15 MG/DL (ref 7–20)
CALCIUM SERPL-MCNC: 9.6 MG/DL (ref 8.3–10.6)
CHLORIDE BLD-SCNC: 98 MMOL/L (ref 99–110)
CO2: 29 MMOL/L (ref 21–32)
CREAT SERPL-MCNC: 0.9 MG/DL (ref 0.9–1.3)
GFR AFRICAN AMERICAN: >60
GFR NON-AFRICAN AMERICAN: >60
GLUCOSE BLD-MCNC: 88 MG/DL (ref 70–99)
HCT VFR BLD CALC: 48.2 % (ref 40.5–52.5)
HEMOGLOBIN: 16.5 G/DL (ref 13.5–17.5)
MCH RBC QN AUTO: 32.4 PG (ref 26–34)
MCHC RBC AUTO-ENTMCNC: 34.3 G/DL (ref 31–36)
MCV RBC AUTO: 94.5 FL (ref 80–100)
PDW BLD-RTO: 12.9 % (ref 12.4–15.4)
PLATELET # BLD: 202 K/UL (ref 135–450)
PMV BLD AUTO: 8.9 FL (ref 5–10.5)
POTASSIUM SERPL-SCNC: 4 MMOL/L (ref 3.5–5.1)
RBC # BLD: 5.1 M/UL (ref 4.2–5.9)
SODIUM BLD-SCNC: 140 MMOL/L (ref 136–145)
TOTAL PROTEIN: 7.4 G/DL (ref 6.4–8.2)
TSH REFLEX: 0.65 UIU/ML (ref 0.27–4.2)
WBC # BLD: 5.3 K/UL (ref 4–11)
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
## 2022-09-29 NOTE — ASSESSMENT & PLAN NOTE
Pt. With concerns about possible poisoning, however, symptoms rather vague, will check heavy metals labs along with other work up.

## 2022-10-01 LAB
ARSENIC BLOOD: <10 UG/L
LEAD LEVEL BLOOD: <2 UG/DL
MERCURY BLOOD: <2.5 UG/L

## 2022-10-10 ENCOUNTER — TELEPHONE (OUTPATIENT)
Dept: FAMILY MEDICINE CLINIC | Age: 27
End: 2022-10-10

## 2022-10-10 NOTE — TELEPHONE ENCOUNTER
----- Message from Harshal Walden sent at 10/10/2022  9:54 AM EDT -----  Subject: Results Request    QUESTIONS  Results: Heavy Metals ;  Ordered by:   Date Performed: 2022-09-30  ---------------------------------------------------------------------------  --------------  Sathish Jeffrey INFO    [phone]; OK to leave message on voicemail  ---------------------------------------------------------------------------  --------------

## 2022-12-28 ENCOUNTER — OFFICE VISIT (OUTPATIENT)
Dept: FAMILY MEDICINE CLINIC | Age: 27
End: 2022-12-28
Payer: COMMERCIAL

## 2022-12-28 VITALS
HEIGHT: 71 IN | BODY MASS INDEX: 21 KG/M2 | SYSTOLIC BLOOD PRESSURE: 128 MMHG | DIASTOLIC BLOOD PRESSURE: 80 MMHG | WEIGHT: 150 LBS | HEART RATE: 100 BPM | OXYGEN SATURATION: 99 % | TEMPERATURE: 99.5 F

## 2022-12-28 DIAGNOSIS — K52.9 ACUTE GASTROENTERITIS: ICD-10-CM

## 2022-12-28 PROCEDURE — G8420 CALC BMI NORM PARAMETERS: HCPCS | Performed by: NURSE PRACTITIONER

## 2022-12-28 PROCEDURE — 4004F PT TOBACCO SCREEN RCVD TLK: CPT | Performed by: NURSE PRACTITIONER

## 2022-12-28 PROCEDURE — 99213 OFFICE O/P EST LOW 20 MIN: CPT | Performed by: NURSE PRACTITIONER

## 2022-12-28 PROCEDURE — G8484 FLU IMMUNIZE NO ADMIN: HCPCS | Performed by: NURSE PRACTITIONER

## 2022-12-28 PROCEDURE — G8427 DOCREV CUR MEDS BY ELIG CLIN: HCPCS | Performed by: NURSE PRACTITIONER

## 2022-12-28 RX ORDER — ONDANSETRON 4 MG/1
4 TABLET, ORALLY DISINTEGRATING ORAL 3 TIMES DAILY PRN
Qty: 21 TABLET | Refills: 0 | Status: SHIPPED | OUTPATIENT
Start: 2022-12-28

## 2022-12-28 ASSESSMENT — ENCOUNTER SYMPTOMS
ABDOMINAL PAIN: 0
COLOR CHANGE: 0
SINUS PAIN: 0
COUGH: 0
BACK PAIN: 0
CONSTIPATION: 0
WHEEZING: 0
SORE THROAT: 1
VOMITING: 1
DIARRHEA: 0
SHORTNESS OF BREATH: 0
NAUSEA: 1
SINUS PRESSURE: 0

## 2022-12-28 NOTE — PROGRESS NOTES
Ana Lovell (:  1995) is a 32 y.o. male,Established patient, here for evaluation of the following chief complaint(s):  Abdominal Pain (Nausea, no vomiting, issues eating), Fever (Started 2022, 102.8, headaches), and Chills      ASSESSMENT/PLAN:  1. Acute gastroenteritis  Assessment & Plan: Onset 3 to 4 days ago  Slightly better today  And vomiting much, mostly nausea  As Tylenol/ibuprofen for fever and body aches  Increase fluids  Clear liquid diet discussed  Call if no better in 2 to 3 days    No follow-ups on file. SUBJECTIVE/OBJECTIVE:  Nausea & Vomiting  This is a new problem. The current episode started in the past 7 days (3-4 days ago). The problem has been gradually improving. Associated symptoms include chills, fatigue, a fever, myalgias, nausea, a sore throat and vomiting. Pertinent negatives include no abdominal pain, arthralgias, chest pain, congestion, coughing, headaches, rash or weakness. The symptoms are aggravated by eating. He has tried acetaminophen and rest for the symptoms. The treatment provided mild relief. Current Outpatient Medications   Medication Sig Dispense Refill    ondansetron (ZOFRAN-ODT) 4 MG disintegrating tablet Take 1 tablet by mouth 3 times daily as needed for Nausea or Vomiting 21 tablet 0    amphetamine-dextroamphetamine (ADDERALL) 10 MG tablet Take 10 mg by mouth daily. (Patient not taking: Reported on 2022)      fexofenadine (ALLEGRA ALLERGY) 180 MG tablet Take 1 tablet by mouth daily (Patient not taking: Reported on 2022) 30 tablet 3    famotidine (PEPCID) 40 MG tablet Take 1 tablet by mouth every evening (Patient not taking: Reported on 2022) 30 tablet 3     No current facility-administered medications for this visit. Review of Systems   Constitutional:  Positive for chills, fatigue and fever. HENT:  Positive for sore throat. Negative for congestion, sinus pressure and sinus pain.     Respiratory:  Negative for cough, shortness of breath and wheezing. Cardiovascular:  Negative for chest pain and palpitations. Gastrointestinal:  Positive for nausea and vomiting. Negative for abdominal pain, constipation and diarrhea. Musculoskeletal:  Positive for myalgias. Negative for arthralgias and back pain. Skin:  Negative for color change, pallor and rash. Neurological:  Negative for dizziness, syncope, weakness, light-headedness and headaches. Psychiatric/Behavioral:  Negative for behavioral problems, confusion and sleep disturbance. The patient is not nervous/anxious. Vitals:    12/28/22 1250   BP: 128/80   Site: Left Upper Arm   Position: Sitting   Cuff Size: Medium Adult   Pulse: 100   Temp: 99.5 °F (37.5 °C)   SpO2: 99%   Weight: 150 lb (68 kg)   Height: 5' 11\" (1.803 m)       Physical Exam  Constitutional:       Appearance: He is well-developed. HENT:      Head: Normocephalic and atraumatic. Eyes:      Conjunctiva/sclera: Conjunctivae normal.      Pupils: Pupils are equal, round, and reactive to light. Neck:      Thyroid: No thyromegaly. Vascular: No JVD. Cardiovascular:      Rate and Rhythm: Normal rate and regular rhythm. Heart sounds: Normal heart sounds. Pulmonary:      Effort: Pulmonary effort is normal. No respiratory distress. Breath sounds: Normal breath sounds. No wheezing. Abdominal:      General: Bowel sounds are normal.      Palpations: Abdomen is soft. Tenderness: There is no abdominal tenderness. Musculoskeletal:         General: No deformity. Normal range of motion. Cervical back: Normal range of motion and neck supple. Skin:     General: Skin is warm and dry. Capillary Refill: Capillary refill takes less than 2 seconds. Neurological:      Mental Status: He is alert and oriented to person, place, and time. Psychiatric:         Behavior: Behavior normal.               An electronic signature was used to authenticate this note.     --CORBY Mclean - CNP

## 2022-12-28 NOTE — ASSESSMENT & PLAN NOTE
Onset 3 to 4 days ago  Slightly better today  And vomiting much, mostly nausea  As Tylenol/ibuprofen for fever and body aches  Increase fluids  Clear liquid diet discussed  Call if no better in 2 to 3 days

## 2023-09-19 ENCOUNTER — OFFICE VISIT (OUTPATIENT)
Dept: FAMILY MEDICINE CLINIC | Age: 28
End: 2023-09-19
Payer: COMMERCIAL

## 2023-09-19 VITALS
SYSTOLIC BLOOD PRESSURE: 124 MMHG | TEMPERATURE: 97.3 F | HEART RATE: 96 BPM | WEIGHT: 164 LBS | OXYGEN SATURATION: 98 % | DIASTOLIC BLOOD PRESSURE: 76 MMHG | BODY MASS INDEX: 22.87 KG/M2

## 2023-09-19 DIAGNOSIS — T49.93XD: ICD-10-CM

## 2023-09-19 PROCEDURE — 99213 OFFICE O/P EST LOW 20 MIN: CPT | Performed by: NURSE PRACTITIONER

## 2023-09-19 SDOH — ECONOMIC STABILITY: FOOD INSECURITY: WITHIN THE PAST 12 MONTHS, THE FOOD YOU BOUGHT JUST DIDN'T LAST AND YOU DIDN'T HAVE MONEY TO GET MORE.: NEVER TRUE

## 2023-09-19 SDOH — ECONOMIC STABILITY: HOUSING INSECURITY
IN THE LAST 12 MONTHS, WAS THERE A TIME WHEN YOU DID NOT HAVE A STEADY PLACE TO SLEEP OR SLEPT IN A SHELTER (INCLUDING NOW)?: NO

## 2023-09-19 SDOH — ECONOMIC STABILITY: FOOD INSECURITY: WITHIN THE PAST 12 MONTHS, YOU WORRIED THAT YOUR FOOD WOULD RUN OUT BEFORE YOU GOT MONEY TO BUY MORE.: NEVER TRUE

## 2023-09-19 SDOH — ECONOMIC STABILITY: INCOME INSECURITY: HOW HARD IS IT FOR YOU TO PAY FOR THE VERY BASICS LIKE FOOD, HOUSING, MEDICAL CARE, AND HEATING?: NOT HARD AT ALL

## 2023-09-19 ASSESSMENT — PATIENT HEALTH QUESTIONNAIRE - PHQ9
SUM OF ALL RESPONSES TO PHQ QUESTIONS 1-9: 0
SUM OF ALL RESPONSES TO PHQ QUESTIONS 1-9: 0
2. FEELING DOWN, DEPRESSED OR HOPELESS: 0
1. LITTLE INTEREST OR PLEASURE IN DOING THINGS: 0
SUM OF ALL RESPONSES TO PHQ QUESTIONS 1-9: 0
SUM OF ALL RESPONSES TO PHQ QUESTIONS 1-9: 0
SUM OF ALL RESPONSES TO PHQ9 QUESTIONS 1 & 2: 0
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
